# Patient Record
Sex: FEMALE | Race: BLACK OR AFRICAN AMERICAN | NOT HISPANIC OR LATINO | URBAN - NONMETROPOLITAN AREA
[De-identification: names, ages, dates, MRNs, and addresses within clinical notes are randomized per-mention and may not be internally consistent; named-entity substitution may affect disease eponyms.]

---

## 2016-06-30 PROBLEM — Z83.511: Noted: 2018-04-12

## 2016-06-30 PROBLEM — H25.13: Noted: 2018-04-12

## 2019-01-18 NOTE — PATIENT DISCUSSION
Monitor for changes. Discussed TOI and increased symptoms with chemo and radiation. Recommended ATs 2-4 times per day or prn.

## 2019-07-11 ENCOUNTER — IMPORTED ENCOUNTER (OUTPATIENT)
Dept: URBAN - NONMETROPOLITAN AREA CLINIC 1 | Facility: CLINIC | Age: 54
End: 2019-07-11

## 2019-07-11 PROCEDURE — 92014 COMPRE OPH EXAM EST PT 1/>: CPT

## 2019-07-11 NOTE — PATIENT DISCUSSION
POAG-SLarge Cups normal RNFL(+) fam hxCataract OU-Not yet surgical. -Reviewed symptoms of advancing cataract growth such as glare and halos and decreased vision.-Continue to monitor for now. Pt will notify us if any new symptoms develop. Updated spec Rx given. Recommend lens that will provide comfort as well as protect safety and health of eyes.; 's Notes: Stressed the need for compliance with appts and testing.

## 2022-04-09 ASSESSMENT — TONOMETRY
OS_IOP_MMHG: 18
OD_IOP_MMHG: 18

## 2022-04-09 ASSESSMENT — VISUAL ACUITY
OS_CC: J7
OD_CC: 20/70-1
OD_CC: J7
OS_CC: 20/80-1